# Patient Record
Sex: FEMALE | Race: BLACK OR AFRICAN AMERICAN | Employment: FULL TIME | URBAN - METROPOLITAN AREA
[De-identification: names, ages, dates, MRNs, and addresses within clinical notes are randomized per-mention and may not be internally consistent; named-entity substitution may affect disease eponyms.]

---
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
## 2022-05-04 NOTE — PROGRESS NOTES
Dr. Sonya Martinez I added ARB for bp control and will see her in June.  She could not come sooner.    She did ask for a GI referral bc you discussed her enlarged liver that was seen on January scan.  I saw you placed a referral but she did not see who she should call and I saw no instructions on there.  Could you have Kathya follow-up with her on the GI referral?    Isabella HPI  Pt started with sore throat on left side of throat last week. Started feeling sicker 4 days ago-now has headache, severe body aches, sore throat. Denies fever and cough. Has been taking tylenol and aleve. Feels very tired.      Review of Systems   Con Ear: External ear normal.   Left Ear: External ear normal.   Nose: Mucosal edema and rhinorrhea present. Mouth/Throat: Uvula is midline and mucous membranes are normal. Posterior oropharyngeal erythema present.  No oropharyngeal exudate or posterior oroph

## 2023-10-04 ENCOUNTER — ANESTHESIA (OUTPATIENT)
Dept: SURGERY | Facility: HOSPITAL | Age: 23
End: 2023-10-04
Payer: MEDICAID

## 2023-10-04 ENCOUNTER — HOSPITAL ENCOUNTER (EMERGENCY)
Facility: HOSPITAL | Age: 23
Discharge: HOME | End: 2023-10-04
Attending: OBSTETRICS & GYNECOLOGY

## 2023-10-04 ENCOUNTER — ANESTHESIA EVENT (OUTPATIENT)
Dept: SURGERY | Facility: HOSPITAL | Age: 23
End: 2023-10-04
Payer: MEDICAID

## 2023-10-04 ENCOUNTER — APPOINTMENT (OUTPATIENT)
Dept: ULTRASOUND IMAGING | Facility: HOSPITAL | Age: 23
End: 2023-10-04
Attending: NURSE PRACTITIONER

## 2023-10-04 VITALS
WEIGHT: 117 LBS | HEIGHT: 64 IN | DIASTOLIC BLOOD PRESSURE: 61 MMHG | BODY MASS INDEX: 19.97 KG/M2 | RESPIRATION RATE: 20 BRPM | TEMPERATURE: 99 F | OXYGEN SATURATION: 99 % | SYSTOLIC BLOOD PRESSURE: 114 MMHG | HEART RATE: 93 BPM

## 2023-10-04 DIAGNOSIS — O00.90 RUPTURED ECTOPIC PREGNANCY: Primary | ICD-10-CM

## 2023-10-04 LAB
ANTIBODY SCREEN: NEGATIVE
B-HCG SERPL-ACNC: 244 MIU/ML
B-HCG UR QL: NEGATIVE
B-HCG UR QL: POSITIVE
BASOPHILS # BLD AUTO: 0.06 X10(3) UL (ref 0–0.2)
BASOPHILS NFR BLD AUTO: 0.3 %
BILIRUB UR QL: NEGATIVE
CLARITY UR: CLEAR
COLOR UR: COLORLESS
DEPRECATED RDW RBC AUTO: 42.9 FL (ref 35.1–46.3)
EOSINOPHIL # BLD AUTO: 0.06 X10(3) UL (ref 0–0.7)
EOSINOPHIL NFR BLD AUTO: 0.3 %
ERYTHROCYTE [DISTWIDTH] IN BLOOD BY AUTOMATED COUNT: 12.7 % (ref 11–15)
GLUCOSE UR-MCNC: NORMAL MG/DL
HCT VFR BLD AUTO: 44.3 %
HGB BLD-MCNC: 15 G/DL
IMM GRANULOCYTES # BLD AUTO: 0.09 X10(3) UL (ref 0–1)
IMM GRANULOCYTES NFR BLD: 0.4 %
LEUKOCYTE ESTERASE UR QL STRIP.AUTO: NEGATIVE
LYMPHOCYTES # BLD AUTO: 2.18 X10(3) UL (ref 1–4)
LYMPHOCYTES NFR BLD AUTO: 10.6 %
MCH RBC QN AUTO: 31.1 PG (ref 26–34)
MCHC RBC AUTO-ENTMCNC: 33.9 G/DL (ref 31–37)
MCV RBC AUTO: 91.9 FL
MONOCYTES # BLD AUTO: 0.89 X10(3) UL (ref 0.1–1)
MONOCYTES NFR BLD AUTO: 4.3 %
NEUTROPHILS # BLD AUTO: 17.31 X10 (3) UL (ref 1.5–7.7)
NEUTROPHILS # BLD AUTO: 17.31 X10(3) UL (ref 1.5–7.7)
NEUTROPHILS NFR BLD AUTO: 84.1 %
NITRITE UR QL STRIP.AUTO: NEGATIVE
PH UR: 7 [PH] (ref 5–8)
PLATELET # BLD AUTO: 377 10(3)UL (ref 150–450)
PROT UR-MCNC: NEGATIVE MG/DL
RBC # BLD AUTO: 4.82 X10(6)UL
RH BLOOD TYPE: POSITIVE
RH BLOOD TYPE: POSITIVE
SARS-COV-2 RNA RESP QL NAA+PROBE: NOT DETECTED
SP GR UR STRIP: 1 (ref 1–1.03)
UROBILINOGEN UR STRIP-ACNC: NORMAL
WBC # BLD AUTO: 20.6 X10(3) UL (ref 4–11)

## 2023-10-04 PROCEDURE — 0UT64ZZ RESECTION OF LEFT FALLOPIAN TUBE, PERCUTANEOUS ENDOSCOPIC APPROACH: ICD-10-PCS | Performed by: OBSTETRICS & GYNECOLOGY

## 2023-10-04 PROCEDURE — 59151 TREAT ECTOPIC PREGNANCY: CPT | Performed by: OBSTETRICS & GYNECOLOGY

## 2023-10-04 PROCEDURE — 76801 OB US < 14 WKS SINGLE FETUS: CPT | Performed by: NURSE PRACTITIONER

## 2023-10-04 PROCEDURE — 10T24ZZ RESECTION OF PRODUCTS OF CONCEPTION, ECTOPIC, PERCUTANEOUS ENDOSCOPIC APPROACH: ICD-10-PCS | Performed by: OBSTETRICS & GYNECOLOGY

## 2023-10-04 PROCEDURE — 76817 TRANSVAGINAL US OBSTETRIC: CPT | Performed by: NURSE PRACTITIONER

## 2023-10-04 RX ORDER — HYDROCODONE BITARTRATE AND ACETAMINOPHEN 5; 325 MG/1; MG/1
1-2 TABLET ORAL EVERY 6 HOURS PRN
Qty: 30 TABLET | Refills: 0 | Status: SHIPPED | OUTPATIENT
Start: 2023-10-04

## 2023-10-04 RX ORDER — IBUPROFEN 600 MG/1
600 TABLET ORAL EVERY 8 HOURS PRN
Qty: 30 TABLET | Refills: 0 | Status: SHIPPED | OUTPATIENT
Start: 2023-10-04

## 2023-10-04 RX ORDER — MORPHINE SULFATE 4 MG/ML
2 INJECTION, SOLUTION INTRAMUSCULAR; INTRAVENOUS EVERY 10 MIN PRN
Status: DISCONTINUED | OUTPATIENT
Start: 2023-10-04 | End: 2023-10-04

## 2023-10-04 RX ORDER — KETOROLAC TROMETHAMINE 30 MG/ML
30 INJECTION, SOLUTION INTRAMUSCULAR; INTRAVENOUS EVERY 6 HOURS PRN
Status: DISCONTINUED | OUTPATIENT
Start: 2023-10-04 | End: 2023-10-04

## 2023-10-04 RX ORDER — GLYCOPYRROLATE 0.2 MG/ML
INJECTION, SOLUTION INTRAMUSCULAR; INTRAVENOUS AS NEEDED
Status: DISCONTINUED | OUTPATIENT
Start: 2023-10-04 | End: 2023-10-04 | Stop reason: SURG

## 2023-10-04 RX ORDER — CLINDAMYCIN PHOSPHATE 150 MG/ML
INJECTION, SOLUTION INTRAVENOUS AS NEEDED
Status: DISCONTINUED | OUTPATIENT
Start: 2023-10-04 | End: 2023-10-04 | Stop reason: SURG

## 2023-10-04 RX ORDER — SODIUM CHLORIDE, SODIUM LACTATE, POTASSIUM CHLORIDE, CALCIUM CHLORIDE 600; 310; 30; 20 MG/100ML; MG/100ML; MG/100ML; MG/100ML
INJECTION, SOLUTION INTRAVENOUS CONTINUOUS
Status: DISCONTINUED | OUTPATIENT
Start: 2023-10-04 | End: 2023-10-04

## 2023-10-04 RX ORDER — ONDANSETRON 2 MG/ML
INJECTION INTRAMUSCULAR; INTRAVENOUS AS NEEDED
Status: DISCONTINUED | OUTPATIENT
Start: 2023-10-04 | End: 2023-10-04 | Stop reason: SURG

## 2023-10-04 RX ORDER — SODIUM CHLORIDE, SODIUM LACTATE, POTASSIUM CHLORIDE, CALCIUM CHLORIDE 600; 310; 30; 20 MG/100ML; MG/100ML; MG/100ML; MG/100ML
INJECTION, SOLUTION INTRAVENOUS CONTINUOUS PRN
Status: DISCONTINUED | OUTPATIENT
Start: 2023-10-04 | End: 2023-10-04 | Stop reason: SURG

## 2023-10-04 RX ORDER — MORPHINE SULFATE 10 MG/ML
6 INJECTION, SOLUTION INTRAMUSCULAR; INTRAVENOUS EVERY 10 MIN PRN
Status: DISCONTINUED | OUTPATIENT
Start: 2023-10-04 | End: 2023-10-04

## 2023-10-04 RX ORDER — HYDROMORPHONE HYDROCHLORIDE 1 MG/ML
0.2 INJECTION, SOLUTION INTRAMUSCULAR; INTRAVENOUS; SUBCUTANEOUS EVERY 5 MIN PRN
Status: DISCONTINUED | OUTPATIENT
Start: 2023-10-04 | End: 2023-10-04

## 2023-10-04 RX ORDER — BUPIVACAINE HYDROCHLORIDE 2.5 MG/ML
INJECTION, SOLUTION EPIDURAL; INFILTRATION; INTRACAUDAL AS NEEDED
Status: DISCONTINUED | OUTPATIENT
Start: 2023-10-04 | End: 2023-10-04 | Stop reason: HOSPADM

## 2023-10-04 RX ORDER — LIDOCAINE HYDROCHLORIDE 10 MG/ML
INJECTION, SOLUTION EPIDURAL; INFILTRATION; INTRACAUDAL; PERINEURAL AS NEEDED
Status: DISCONTINUED | OUTPATIENT
Start: 2023-10-04 | End: 2023-10-04 | Stop reason: SURG

## 2023-10-04 RX ORDER — HYDROMORPHONE HYDROCHLORIDE 1 MG/ML
0.6 INJECTION, SOLUTION INTRAMUSCULAR; INTRAVENOUS; SUBCUTANEOUS EVERY 5 MIN PRN
Status: DISCONTINUED | OUTPATIENT
Start: 2023-10-04 | End: 2023-10-04

## 2023-10-04 RX ORDER — ROCURONIUM BROMIDE 10 MG/ML
INJECTION, SOLUTION INTRAVENOUS AS NEEDED
Status: DISCONTINUED | OUTPATIENT
Start: 2023-10-04 | End: 2023-10-04 | Stop reason: SURG

## 2023-10-04 RX ORDER — MORPHINE SULFATE 4 MG/ML
4 INJECTION, SOLUTION INTRAMUSCULAR; INTRAVENOUS EVERY 10 MIN PRN
Status: DISCONTINUED | OUTPATIENT
Start: 2023-10-04 | End: 2023-10-04

## 2023-10-04 RX ORDER — DEXAMETHASONE SODIUM PHOSPHATE 4 MG/ML
VIAL (ML) INJECTION AS NEEDED
Status: DISCONTINUED | OUTPATIENT
Start: 2023-10-04 | End: 2023-10-04 | Stop reason: SURG

## 2023-10-04 RX ORDER — HYDROMORPHONE HYDROCHLORIDE 1 MG/ML
0.4 INJECTION, SOLUTION INTRAMUSCULAR; INTRAVENOUS; SUBCUTANEOUS EVERY 5 MIN PRN
Status: DISCONTINUED | OUTPATIENT
Start: 2023-10-04 | End: 2023-10-04

## 2023-10-04 RX ORDER — NALOXONE HYDROCHLORIDE 0.4 MG/ML
80 INJECTION, SOLUTION INTRAMUSCULAR; INTRAVENOUS; SUBCUTANEOUS AS NEEDED
Status: DISCONTINUED | OUTPATIENT
Start: 2023-10-04 | End: 2023-10-04

## 2023-10-04 RX ORDER — HYDROCODONE BITARTRATE AND ACETAMINOPHEN 5; 325 MG/1; MG/1
1 TABLET ORAL EVERY 6 HOURS PRN
Status: DISCONTINUED | OUTPATIENT
Start: 2023-10-04 | End: 2023-10-04

## 2023-10-04 RX ADMIN — GLYCOPYRROLATE 0.2 MG: 0.2 INJECTION, SOLUTION INTRAMUSCULAR; INTRAVENOUS at 05:00:00

## 2023-10-04 RX ADMIN — ROCURONIUM BROMIDE 30 MG: 10 INJECTION, SOLUTION INTRAVENOUS at 05:00:00

## 2023-10-04 RX ADMIN — LIDOCAINE HYDROCHLORIDE 50 MG: 10 INJECTION, SOLUTION EPIDURAL; INFILTRATION; INTRACAUDAL; PERINEURAL at 05:00:00

## 2023-10-04 RX ADMIN — SODIUM CHLORIDE, SODIUM LACTATE, POTASSIUM CHLORIDE, CALCIUM CHLORIDE: 600; 310; 30; 20 INJECTION, SOLUTION INTRAVENOUS at 04:56:00

## 2023-10-04 RX ADMIN — CLINDAMYCIN PHOSPHATE 900 MG: 150 INJECTION, SOLUTION INTRAVENOUS at 05:09:00

## 2023-10-04 RX ADMIN — ONDANSETRON 4 MG: 2 INJECTION INTRAMUSCULAR; INTRAVENOUS at 05:00:00

## 2023-10-04 RX ADMIN — DEXAMETHASONE SODIUM PHOSPHATE 4 MG: 4 MG/ML VIAL (ML) INJECTION at 05:00:00

## 2023-10-04 NOTE — ED NOTES
To and from [address] without incident    Moved to room 31    Care endorsed to [facility] rn    Pt updated on plan of care of waiting for US results

## 2023-10-04 NOTE — ED QUICK NOTES
Transport arrived to take pt to OR. Pt remains agreeable to plan. All clothing removed from pt, placed in pt belonging bag and pt changed into gown. Pt remains A&Ox4, respirations even and unlabored, skin warm and dry. IVF continued to floor, IV intact.

## 2023-10-04 NOTE — ANESTHESIA POSTPROCEDURE EVALUATION
Patient: Jonatan Pérez    Procedure Summary       Date: 10/04/23 Room / Location: [address] [place] [address] MAIN OR 03 / [facility] MAIN OR    Anesthesia Start: 1145 Anesthesia Stop: 5383    Procedure: LEFT LAPAROSCOPIC SALPINGECTOMY (Left: Abdomen) Diagnosis:       Ruptured ectopic pregnancy      (Ruptured ectopic pregnancy [O00.90])    Surgeons: Lyle Gracia MD Anesthesiologist: Tonita Seip, MD    Anesthesia Type: general ASA Status: 2 - Emergent            Anesthesia Type: general    Vitals Value Taken Time   /92 10/04/23 0608   Temp 97.5 10/04/23 0608   Pulse 86 10/04/23 0608   Resp 16 10/04/23 0608   SpO2 98 10/04/23 0608       [facility] AN Post Evaluation:   Patient Evaluated in PACU  Patient Participation: complete - patient participated  Level of Consciousness: awake  Pain Management: adequate  Airway Patency:patent  Dental exam unchanged from preop  Yes    Cardiovascular Status: acceptable  Respiratory Status: acceptable  Postoperative Hydration acceptable      Nae Bob MD  10/4/2023 6:08 AM

## 2023-10-04 NOTE — ED INITIAL ASSESSMENT (HPI)
Patient arrives with complaints of abdominal cramping that began earlier today, states she started her menstrual cycle yesterday.  Patient reports bleeding is heavier than usual.

## 2023-10-04 NOTE — ED PROVIDER NOTES
Patient endorsed to me by [facility], awaiting ultrasound. Pt is . Bleeding/spotting/lower abdominal pain for 2 days. Found out pregnant tonight. Feels better on my assessment with stable vital signs      US OB    IMPRESSION:  No intrauterine IUP. Endometrium 7 mm. 1.5 x 1 x 0.9 cm peripherally hyperechoic centrally cystic focus in the left adnexa worrisome for tubal ectopic pregnancy. This structure is separate from the left ovary. There is moderate complex fluid in the pelvis suggesting rupture. Recommend OB/oncologic consult. Probable corpus luteum cyst within the left ovary. Right ovarian follicles. Endometrium 7 mm. Case discussed with Dr. Sabrina Jimenez at 04:07 AM GALE Garcia MD/PhD    Patient notified of ultrasound results. Notified of plan for emergency surgery. Advised remain NPO. Discussed with Dr. Nany Kaiser who will see patient in the emergency department and take to OR likely tonight.

## 2023-10-04 NOTE — OPERATIVE REPORT
[facility]    Gyne Operative Note    Steven Collins Patient Status:  Emergency    10/13/2000 MRN [id number]   Location [place] [address] Attending Louise Lira MD   Hosp Day # 0 PCP Nils Pleitez MD     Pre-Operative Diagnosis: Ruptured ectopic pregnancy [O00.90]     Post-Operative Diagnosis: Ruptured ectopic pregnancy [O00.90]      Procedure Performed:   LEFT LAPAROSCOPIC SALPINGECTOMY    Surgeon(s) and Role:     Matias Sykes MD - Primary     * Valentin Lebron MD - Assisting Surgeon     Surgical Findings: Left tubal ectopic pregnancy, about 200cc of intraperitoneal blood, normal ovaries bilaterally, normal right fallopian tube. Otherwise normal abdominal survey     Specimen: Left fallopian tube with ectopic     Estimated Blood Loss: 200cc    Anesthesia: General by Dr. Jaciel Burgess    Antibiotics:  Clindamycin 116AT    Complications: none    Condition: stable      PROCEDURE:  After assuring informed consent, the patient was taken to the operating room, where general anesthesia was administered. She was prepped and draped in the usual sterile manner and placed in the dorsal lithotomy position with legs in Iftikhar stirrups and arms tucked & body supported by Hug A Vac ensuring no nerve pressure. Jenkins catheter was placed. A speculum was inserted. Cervix was grasped and a uterine manipulator was placed without difficulty. Surgeon's gloves were exchanged. Attention was then focused to the abdomen. 0.25% Marcaine was injected into the umbilical apex then a 10 mm skin incision was made with #11 blade. Blunt dissection of adipose tissue done. The fascia was grasped with kocher clamps and incised with a scapel. The fascia was tagged with 0-vicryl and an 11 mm trocar placed directly into abdomen. Laparascopic camera confirmed placement within peritoneal cavity. CO2 gas attached & abdomen insufflated to max 15 mmHG.   Two 5 mm trocars placed under direct visual guidance in left & right lower quadrants. Attention was then focused to the pelvis with the above findings. The left fallopian tube with ectopic pregnancy was ligated used the Enseal device and removed using a laparoscopic bag. Excellent hemostasis noted. A survey of the abdomen demonstrated no injuy to bowel with normal appearance of organs. The pelvis was irrigated and found to be hemostatic as well as bilateral lateral sidewalls. All instruments were then removed from the abdomen. The umbilical fascia was closed with O Vicryl. The knots tied after pneumoperitoneum released & 5 positive pressure breathes given by anesthesia. The umbilical skin incision was closed with 4-0 Monocryl in a subcuticular fashion. Dermabond applied to the 5mm port sites. The patient tolerated the procedure well. Sponge, lap, needle counts were correct x2. She was taken to post anesthesia recovery in stable condition.   Angeles Jara MD  10/4/2023 5:50 AM

## 2023-10-04 NOTE — ANESTHESIA PROCEDURE NOTES
Airway  Date/Time: 10/4/2023 5:16 AM  Urgency: Elective      General Information and Staff    Patient location during procedure: OR  Anesthesiologist: Ananda Stroud MD  Performed: anesthesiologist   Performed by: Ananda Stroud MD  Authorized by: Ananda Stroud MD      Indications and Patient Condition  Indications for airway management: anesthesia  Sedation level: deep  Preoxygenated: yes  Patient position: sniffing  Mask difficulty assessment: 1 - vent by mask    Final Airway Details  Final airway type: endotracheal airway      Successful airway: ETT  Cuffed: yes   Successful intubation technique: direct laryngoscopy  Endotracheal tube insertion site: oral  Blade: Anival  Blade size: #3  ETT size (mm): 7.0    Placement verified by: capnometry   Measured from: teeth  Number of attempts at approach: 1

## 2023-10-04 NOTE — ED QUICK NOTES
Received report, assumed care of pt. Pt returned from [address] in stable condition. Pt A&Ox4, respirations even and unlabored, skin warm and dry.

## 2023-10-04 NOTE — ED QUICK NOTES
Orders for admission, patient is aware of plan and ready to go upstairs. Any questions, please call ED RN Mary White  at extension [phone].      Patient Covid vaccination status: Unvaccinated     COVID Test Ordered in ED: Rapid SARS-CoV-2 by PCR    COVID Suspicion at Admission: N/A    Running Infusions:      Mental Status/LOC at time of transport: A&Ox4    Other pertinent information:   CIWA score: N/A   NIH score:  N/A

## 2023-10-07 ENCOUNTER — HOSPITAL ENCOUNTER (EMERGENCY)
Facility: HOSPITAL | Age: 23
Discharge: HOME | End: 2023-10-07
Attending: EMERGENCY MEDICINE

## 2023-10-07 VITALS
DIASTOLIC BLOOD PRESSURE: 62 MMHG | RESPIRATION RATE: 16 BRPM | HEART RATE: 75 BPM | OXYGEN SATURATION: 98 % | HEIGHT: 62 IN | WEIGHT: 115 LBS | SYSTOLIC BLOOD PRESSURE: 102 MMHG | TEMPERATURE: 98 F | BODY MASS INDEX: 21.16 KG/M2

## 2023-10-07 DIAGNOSIS — L24.A9 WOUND DRAINAGE: Primary | ICD-10-CM

## 2023-10-07 PROCEDURE — 87205 SMEAR GRAM STAIN: CPT | Performed by: EMERGENCY MEDICINE

## 2023-10-07 PROCEDURE — 99283 EMERGENCY DEPT VISIT LOW MDM: CPT

## 2023-10-07 PROCEDURE — 87070 CULTURE OTHR SPECIMN AEROBIC: CPT | Performed by: EMERGENCY MEDICINE

## 2023-10-08 NOTE — ED QUICK NOTES
Pt in stable condition. Pt is A&O x 4 speaking in complete coherent sentences. RR even and unlabored. Pt educated on discharge paperwork including  f/u care. Pt able to verbally teach back and denies any questions or concerns. Pt ambulated out of ED with even steady gait.
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
## 2023-11-15 ENCOUNTER — HOSPITAL ENCOUNTER (OUTPATIENT)
Dept: ULTRASOUND IMAGING | Facility: HOSPITAL | Age: 23
Discharge: HOME | End: 2023-11-15
Attending: ADVANCED PRACTICE MIDWIFE
Payer: MEDICAID

## 2023-11-15 ENCOUNTER — TELEPHONE (OUTPATIENT)
Dept: OBGYN CLINIC | Facility: CLINIC | Age: 23
End: 2023-11-15

## 2023-11-15 ENCOUNTER — OFFICE VISIT (OUTPATIENT)
Dept: OBGYN CLINIC | Facility: CLINIC | Age: 23
End: 2023-11-15
Payer: MEDICAID

## 2023-11-15 ENCOUNTER — LAB ENCOUNTER (OUTPATIENT)
Dept: LAB | Facility: HOSPITAL | Age: 23
End: 2023-11-15
Attending: ADVANCED PRACTICE MIDWIFE
Payer: MEDICAID

## 2023-11-15 VITALS
HEIGHT: 64 IN | DIASTOLIC BLOOD PRESSURE: 57 MMHG | BODY MASS INDEX: 17.75 KG/M2 | SYSTOLIC BLOOD PRESSURE: 90 MMHG | WEIGHT: 104 LBS | HEART RATE: 89 BPM

## 2023-11-15 DIAGNOSIS — Z87.59 HISTORY OF ECTOPIC PREGNANCY: Primary | ICD-10-CM

## 2023-11-15 DIAGNOSIS — Z87.59 HISTORY OF ECTOPIC PREGNANCY: ICD-10-CM

## 2023-11-15 DIAGNOSIS — Z78.9 DATE OF LAST MENSTRUAL PERIOD (LMP) UNKNOWN: ICD-10-CM

## 2023-11-15 PROBLEM — R51.9 CHRONIC NONINTRACTABLE HEADACHE: Status: ACTIVE | Noted: 2023-04-06

## 2023-11-15 PROBLEM — G89.29 CHRONIC NONINTRACTABLE HEADACHE: Status: ACTIVE | Noted: 2023-04-06

## 2023-11-15 PROBLEM — D50.9 IRON DEFICIENCY ANEMIA: Status: ACTIVE | Noted: 2023-04-06

## 2023-11-15 LAB — B-HCG SERPL-ACNC: 5018.7 MIU/ML

## 2023-11-15 PROCEDURE — 84702 CHORIONIC GONADOTROPIN TEST: CPT

## 2023-11-15 PROCEDURE — 99213 OFFICE O/P EST LOW 20 MIN: CPT | Performed by: ADVANCED PRACTICE MIDWIFE

## 2023-11-15 PROCEDURE — 3074F SYST BP LT 130 MM HG: CPT | Performed by: ADVANCED PRACTICE MIDWIFE

## 2023-11-15 PROCEDURE — 3008F BODY MASS INDEX DOCD: CPT | Performed by: ADVANCED PRACTICE MIDWIFE

## 2023-11-15 PROCEDURE — 76801 OB US < 14 WKS SINGLE FETUS: CPT | Performed by: ADVANCED PRACTICE MIDWIFE

## 2023-11-15 PROCEDURE — 76817 TRANSVAGINAL US OBSTETRIC: CPT | Performed by: ADVANCED PRACTICE MIDWIFE

## 2023-11-15 PROCEDURE — 3078F DIAST BP <80 MM HG: CPT | Performed by: ADVANCED PRACTICE MIDWIFE

## 2023-11-15 PROCEDURE — 36415 COLL VENOUS BLD VENIPUNCTURE: CPT

## 2023-11-15 NOTE — PROGRESS NOTES
Chief Complaint   Patient presents with    Consult     Missed menses, LMP 10/01/23, LIZY  24,pt does not want to continue pregnancy ,   Pt did have HCG levels drawn ,         HPI:   Oly Dorman is 24year old , here today with concern for new pregnancy after surgery for ruptured ectopic last month. Was cared for by [facility] for that procedure. Went to [facility] this week for follow-up and found out she was pregnant again. They said they cannot care for her this pregnancy due to insurance issue so she came here. She denies pain or bleeding at this time but she is scared that she could have a repeat ectopic. Patient Active Problem List   Diagnosis    Ruptured ectopic pregnancy    Chronic nonintractable headache    Iron deficiency anemia        Note: This is a gyn only visit. Pt has PCP and is referred back to PCP for care of any non-gyn concerns listed above in the Problem List.    Medications (Active prior to today's visit):  Current Outpatient Medications   Medication Sig Dispense Refill    HYDROcodone-acetaminophen 5-325 MG Oral Tab Take 1-2 tablets by mouth every 6 (six) hours as needed for Pain. (Patient not taking: Reported on 11/15/2023) 30 tablet 0    ibuprofen 600 MG Oral Tab Take 1 tablet (600 mg total) by mouth every 8 (eight) hours as needed for Pain. (Patient not taking: Reported on 11/15/2023) 30 tablet 0    ondansetron 4 MG Oral Tablet Dispersible Take 1 tablet (4 mg total) by mouth every 8 (eight) hours as needed for Nausea. (Patient not taking: Reported on 11/15/2023) 20 tablet 0    ibuprofen 600 MG Oral Tab Take 1 tablet (600 mg total) by mouth every 6 hours as needed with food (Patient not taking: Reported on 11/15/2023) 20 tablet 0       Allergies: Allergies   Allergen Reactions    Amoxicillin OTHER (SEE COMMENTS)       ROS:  Review of Systems   Constitutional:  Negative for chills and fever. Gastrointestinal:  Negative for abdominal pain.    Genitourinary:  Negative for pelvic pain and vaginal bleeding. Musculoskeletal:  Negative for back pain. PHYSICAL EXAM:  Vitals:    11/15/23 0934   BP: 90/57   Pulse: 89     Physical Exam  Vitals reviewed. Constitutional:       Appearance: Normal appearance. HENT:      Head: Normocephalic. Pulmonary:      Effort: Pulmonary effort is normal.   Abdominal:      Tenderness: There is no abdominal tenderness. There is no guarding or rebound. Neurological:      Mental Status: She is alert and oriented to person, place, and time. Psychiatric:         Behavior: Behavior normal.         Thought Content: Thought content normal.         Judgment: Judgment normal.        11/13 bHCG at [facility] was 2K+  O positive blood type     ASSESSMENT/PLAN:     Cloin Diaz was seen today for consult. Diagnoses and all orders for this visit:    History of ectopic pregnancy  -     HCG, Beta Subunit (Quant Pregnancy Test); Future  -     US PREG 1ST TRIMESTER (CPT=76801); Future       Will await bHCG result prior to scheduling ultrasound    Follow-up/Return to clinic: pending today's results    Patient verbalized understanding, All questions answered.  No barriers to learning identified

## 2023-11-15 NOTE — TELEPHONE ENCOUNTER
Outgoing call to ultrasound. Patient scheduled for a hold and call at 4 pm today. Main diagnostic. Name and  of patient verified    Patient notified of hold and call ultrasound scheduled at 4 pm. Parking and water instructions given. Order placed.

## 2023-11-16 NOTE — TELEPHONE ENCOUNTER
Spoke with patient with ultrasound results. Intrauterine gestational sac and yolk sac. No fetal pole at this time. Likely too early to date. Recommend repeat ultrasound in 11-14 days. Order placed. Reviewed warning signs and when to call.

## 2023-11-27 ENCOUNTER — HOSPITAL ENCOUNTER (OUTPATIENT)
Dept: ULTRASOUND IMAGING | Age: 23
Discharge: HOME | End: 2023-11-27
Attending: ADVANCED PRACTICE MIDWIFE
Payer: MEDICAID

## 2023-11-27 DIAGNOSIS — Z87.59 HISTORY OF ECTOPIC PREGNANCY: ICD-10-CM

## 2023-11-27 PROCEDURE — 76817 TRANSVAGINAL US OBSTETRIC: CPT | Performed by: ADVANCED PRACTICE MIDWIFE

## 2023-11-27 PROCEDURE — 76801 OB US < 14 WKS SINGLE FETUS: CPT | Performed by: ADVANCED PRACTICE MIDWIFE

## 2024-01-10 NOTE — ED INITIAL ASSESSMENT (HPI)
Lower abdominal cramping. Last period 11/7/18. States that she has been having unprotected sex. Detail Level: Zone Length Of Therapy: 3+ years Date Of Last Negative Ppd (Optional): 1/23 Add High Risk Medication Management Associated Diagnosis?: No

## 2024-12-17 ENCOUNTER — APPOINTMENT (OUTPATIENT)
Dept: GENERAL RADIOLOGY | Age: 24
End: 2024-12-17
Attending: EMERGENCY MEDICINE
Payer: MEDICAID

## 2024-12-17 ENCOUNTER — HOSPITAL ENCOUNTER (OUTPATIENT)
Age: 24
Discharge: HOME | End: 2024-12-17
Payer: MEDICAID

## 2024-12-17 VITALS
DIASTOLIC BLOOD PRESSURE: 67 MMHG | OXYGEN SATURATION: 100 % | TEMPERATURE: 100 F | HEART RATE: 103 BPM | RESPIRATION RATE: 20 BRPM | SYSTOLIC BLOOD PRESSURE: 118 MMHG

## 2024-12-17 DIAGNOSIS — J10.1 INFLUENZA A: Primary | ICD-10-CM

## 2024-12-17 DIAGNOSIS — R09.89 CHEST CONGESTION: ICD-10-CM

## 2024-12-17 DIAGNOSIS — R07.9 ACUTE CHEST PAIN: ICD-10-CM

## 2024-12-17 DIAGNOSIS — R05.9 COUGH: ICD-10-CM

## 2024-12-17 DIAGNOSIS — J98.01 ACUTE BRONCHOSPASM: ICD-10-CM

## 2024-12-17 LAB
ATRIAL RATE: 98 BPM
B-HCG UR QL: NEGATIVE
BASOPHILS # BLD AUTO: 0.02 X10(3) UL (ref 0–0.2)
BASOPHILS NFR BLD AUTO: 0.4 %
BUN BLD-MCNC: 5 MG/DL (ref 7–18)
CHLORIDE BLD-SCNC: 102 MMOL/L (ref 98–112)
CLARITY UR: CLEAR
CO2 BLD-SCNC: 22 MMOL/L (ref 21–32)
CREAT BLD-MCNC: 0.9 MG/DL
DEPRECATED RDW RBC AUTO: 41 FL (ref 35.1–46.3)
EGFRCR SERPLBLD CKD-EPI 2021: 91 ML/MIN/1.73M2 (ref 60–?)
EOSINOPHIL # BLD AUTO: 0.01 X10(3) UL (ref 0–0.7)
EOSINOPHIL NFR BLD AUTO: 0.2 %
ERYTHROCYTE [DISTWIDTH] IN BLOOD BY AUTOMATED COUNT: 12.3 % (ref 11–15)
GLUCOSE BLD-MCNC: 101 MG/DL (ref 70–99)
GLUCOSE UR STRIP-MCNC: NEGATIVE MG/DL
HCT VFR BLD AUTO: 38.8 %
HCT VFR BLD AUTO: 40.1 %
HCT VFR BLD CALC: 43 %
HGB BLD-MCNC: 13.2 G/DL
HGB BLD-MCNC: 13.8 G/DL
IMM GRANULOCYTES # BLD AUTO: 0.02 X10(3) UL (ref 0–1)
IMM GRANULOCYTES NFR BLD: 0.4 %
ISTAT IONIZED CALCIUM FOR CHEM 8: 1.14 MMOL/L (ref 1.12–1.32)
KETONES UR STRIP-MCNC: 80 MG/DL
LEUKOCYTE ESTERASE UR QL STRIP: NEGATIVE
LYMPHOCYTES # BLD AUTO: 0.6 X10(3) UL (ref 1–4)
LYMPHOCYTES NFR BLD AUTO: 12.4 %
MCH RBC QN AUTO: 30.3 PG (ref 26–34)
MCH RBC QN AUTO: 31.9 PG (ref 26–34)
MCHC RBC AUTO-ENTMCNC: 32.9 G/DL (ref 31–37)
MCHC RBC AUTO-ENTMCNC: 35.6 G/DL (ref 31–37)
MCV RBC AUTO: 89.6 FL
MCV RBC AUTO: 92 FL (ref 80–100)
MONOCYTES # BLD AUTO: 0.46 X10(3) UL (ref 0.1–1)
MONOCYTES NFR BLD AUTO: 9.5 %
NEUTROPHILS # BLD AUTO: 3.72 X10 (3) UL (ref 1.5–7.7)
NEUTROPHILS # BLD AUTO: 3.72 X10(3) UL (ref 1.5–7.7)
NEUTROPHILS NFR BLD AUTO: 77.1 %
NITRITE UR QL STRIP: NEGATIVE
P AXIS: 72 DEGREES
P-R INTERVAL: 132 MS
PH UR STRIP: 7.5 [PH]
PLATELET # BLD AUTO: 205 X10ˆ3/UL (ref 150–450)
PLATELET # BLD AUTO: 212 10(3)UL (ref 150–450)
POCT INFLUENZA A: POSITIVE
POCT INFLUENZA B: NEGATIVE
POTASSIUM BLD-SCNC: 3.4 MMOL/L (ref 3.6–5.1)
PROT UR STRIP-MCNC: 100 MG/DL
Q-T INTERVAL: 324 MS
QRS DURATION: 90 MS
QTC CALCULATION (BEZET): 413 MS
R AXIS: 67 DEGREES
RBC # BLD AUTO: 4.33 X10(6)UL
RBC # BLD AUTO: 4.36 X10ˆ6/UL
S PYO AG THROAT QL: NEGATIVE
SODIUM BLD-SCNC: 138 MMOL/L (ref 136–145)
SP GR UR STRIP: 1.02
T AXIS: 49 DEGREES
TROPONIN I BLD-MCNC: <0.02 NG/ML
UROBILINOGEN UR STRIP-ACNC: 2 MG/DL
VENTRICULAR RATE: 98 BPM
WBC # BLD AUTO: 4.8 X10(3) UL (ref 4–11)
WBC # BLD AUTO: 4.9 X10ˆ3/UL (ref 4–11)

## 2024-12-17 PROCEDURE — 87880 STREP A ASSAY W/OPTIC: CPT | Performed by: EMERGENCY MEDICINE

## 2024-12-17 PROCEDURE — A9150 MISC/EXPER NON-PRESCRIPT DRU: HCPCS | Performed by: EMERGENCY MEDICINE

## 2024-12-17 PROCEDURE — 84484 ASSAY OF TROPONIN QUANT: CPT | Performed by: EMERGENCY MEDICINE

## 2024-12-17 PROCEDURE — 85025 COMPLETE CBC W/AUTO DIFF WBC: CPT | Performed by: EMERGENCY MEDICINE

## 2024-12-17 PROCEDURE — 87502 INFLUENZA DNA AMP PROBE: CPT | Performed by: EMERGENCY MEDICINE

## 2024-12-17 PROCEDURE — S0119 ONDANSETRON 4 MG: HCPCS | Performed by: EMERGENCY MEDICINE

## 2024-12-17 PROCEDURE — 99214 OFFICE O/P EST MOD 30 MIN: CPT | Performed by: EMERGENCY MEDICINE

## 2024-12-17 PROCEDURE — 71046 X-RAY EXAM CHEST 2 VIEWS: CPT | Performed by: EMERGENCY MEDICINE

## 2024-12-17 PROCEDURE — 81002 URINALYSIS NONAUTO W/O SCOPE: CPT | Performed by: EMERGENCY MEDICINE

## 2024-12-17 PROCEDURE — 94640 AIRWAY INHALATION TREATMENT: CPT | Performed by: EMERGENCY MEDICINE

## 2024-12-17 PROCEDURE — 80047 BASIC METABLC PNL IONIZED CA: CPT | Performed by: EMERGENCY MEDICINE

## 2024-12-17 PROCEDURE — 81025 URINE PREGNANCY TEST: CPT | Performed by: EMERGENCY MEDICINE

## 2024-12-17 PROCEDURE — 93000 ELECTROCARDIOGRAM COMPLETE: CPT | Performed by: EMERGENCY MEDICINE

## 2024-12-17 RX ORDER — ALBUTEROL SULFATE 90 UG/1
INHALANT RESPIRATORY (INHALATION)
Qty: 1 EACH | Refills: 0 | Status: SHIPPED | OUTPATIENT
Start: 2024-12-17

## 2024-12-17 RX ORDER — IPRATROPIUM BROMIDE AND ALBUTEROL SULFATE 2.5; .5 MG/3ML; MG/3ML
3 SOLUTION RESPIRATORY (INHALATION) ONCE
Status: COMPLETED | OUTPATIENT
Start: 2024-12-17 | End: 2024-12-17

## 2024-12-17 RX ORDER — ONDANSETRON 4 MG/1
TABLET, ORALLY DISINTEGRATING ORAL
Qty: 10 TABLET | Refills: 0 | Status: SHIPPED | OUTPATIENT
Start: 2024-12-17

## 2024-12-17 RX ORDER — BENZONATATE 100 MG/1
100 CAPSULE ORAL 3 TIMES DAILY PRN
Qty: 30 CAPSULE | Refills: 0 | Status: SHIPPED | OUTPATIENT
Start: 2024-12-17

## 2024-12-17 RX ORDER — ONDANSETRON 4 MG/1
8 TABLET, ORALLY DISINTEGRATING ORAL ONCE
Status: COMPLETED | OUTPATIENT
Start: 2024-12-17 | End: 2024-12-17

## 2024-12-17 RX ORDER — ACETAMINOPHEN 500 MG
1000 TABLET ORAL ONCE
Status: COMPLETED | OUTPATIENT
Start: 2024-12-17 | End: 2024-12-17

## 2024-12-17 RX ORDER — OSELTAMIVIR PHOSPHATE 75 MG/1
75 CAPSULE ORAL 2 TIMES DAILY
Qty: 10 CAPSULE | Refills: 0 | Status: SHIPPED | OUTPATIENT
Start: 2024-12-17 | End: 2024-12-22

## 2024-12-17 NOTE — ED PROVIDER NOTES
Patient Seen in: [facility]      History     Chief Complaint   Patient presents with    Cough/URI     Stated Complaint: SOB. cold symptoms    Subjective:   HPI  Steffi Garsia is a 24 year old female here for upper chest pain/chest congestion, cough, fever, and flulike symptoms.  Does not feel short of breath, but is coughing more often.        Objective:     History reviewed. No pertinent past medical history.           History reviewed. No pertinent surgical history.             Social History     Socioeconomic History    Marital status: Single   Tobacco Use    Smoking status: Some Days    Smokeless tobacco: Former   Vaping Use    Vaping status: Never Used   Substance and Sexual Activity    Alcohol use: No    Drug use: Yes     Types: Cannabis     Social Drivers of Health     Financial Resource Strain: Low Risk  (4/29/2024)    Received from [facility]    Overall Financial Resource Strain (CARDIA)     Difficulty of Paying Living Expenses: Not very hard   Food Insecurity: No Food Insecurity (4/29/2024)    Received from [facility]    Hunger Vital Sign     Worried About Running Out of Food in the Last Year: Never true     Ran Out of Food in the Last Year: Never true   Transportation Needs: No Transportation Needs (5/16/2024)    Received from [facility]    PRAPARE - Transportation     Lack of Transportation (Medical): No     Lack of Transportation (Non-Medical): No   Recent Concern: Transportation Needs - Unmet Transportation Needs (4/29/2024)    [facility]     Lack of Transportation (Medical): No     Lack of Transportation (Non-Medical): Yes   Physical Activity: Sufficiently Active (5/16/2024)    Received from [facility]    Exercise Vital Sign     Days of Exercise per Week: 3 days     Minutes of Exercise per Session: 60 min   Stress: No Stress Concern  Present (5/16/2024)    Received from [facility]    [employer] [place] [employer] - Occupational Stress Questionnaire     Feeling of Stress : Not at all   Social Connections: Socially Integrated (5/16/2024)    Received from [facility]    Social Connection and Isolation Panel [NHANES]     Frequency of Communication with Friends and Family: Twice a week     Frequency of Social Gatherings with Friends and Family: Once a week     Attends Buddhist Services: 1 to 4 times per year     Active Member of Clubs or Organizations: Yes     Attends Club or Organization Meetings: 1 to 4 times per year     Marital Status: Living with partner   Housing Stability: Low Risk  (4/29/2024)    Received from [facility]    Housing Stability Vital Sign     Unable to Pay for Housing in the Last Year: No     Number of Places Lived in the Last Year: 1     Unstable Housing in the Last Year: No              Review of Systems    Positive for stated complaint: SOB. cold symptoms  Other systems are as noted in HPI.  Constitutional and vital signs reviewed.      All other systems reviewed and negative except as noted above.    Physical Exam     ED Triage Vitals [12/17/24 1046]   /67   Pulse 103   Resp 20   Temp 100.3 °F (37.9 °C)   Temp src Oral   SpO2 100 %   O2 Device None (Room air)       Current Vitals:   Vital Signs  BP: 118/67  Pulse: 103  Resp: 20  Temp: 100.3 °F (37.9 °C)  Temp src: Oral    Oxygen Therapy  SpO2: 100 %  O2 Device: None (Room air)        Physical Exam  Vitals and nursing note reviewed.   Constitutional:       General: She is not in acute distress.     Appearance: Normal appearance. She is ill-appearing. She is not toxic-appearing.   HENT:      Head: Normocephalic.      Right Ear: Ear canal and external ear normal.      Left Ear: Ear canal and external ear normal.      Nose: Congestion and rhinorrhea (Clear drainage) present.      Mouth/Throat:       Mouth: Mucous membranes are moist.      Pharynx: Posterior oropharyngeal erythema present. No oropharyngeal exudate.   Eyes:      Conjunctiva/sclera: Conjunctivae normal.      Pupils: Pupils are equal, round, and reactive to light.   Cardiovascular:      Rate and Rhythm: Normal rate.      Pulses: Normal pulses.      Comments: Heart rate 98 beats on exam.  Strong and regular.  Normal for age, presenting  Pulmonary:      Effort: Pulmonary effort is normal. No respiratory distress.      Breath sounds: No stridor. Rhonchi (Anterior cleared with cough) present. No wheezing.   Abdominal:      General: There is no distension.      Tenderness: There is no abdominal tenderness. There is no right CVA tenderness, left CVA tenderness or guarding.   Musculoskeletal:      Cervical back: Normal range of motion. No erythema or rigidity. No pain with movement, spinous process tenderness or muscular tenderness. Normal range of motion.   Lymphadenopathy:      Cervical: No cervical adenopathy.      Right cervical: No superficial, deep or posterior cervical adenopathy.     Left cervical: No superficial, deep or posterior cervical adenopathy.   Skin:     General: Skin is warm.      Capillary Refill: Capillary refill takes less than 2 seconds.      Findings: No erythema, lesion or rash.   Neurological:      General: No focal deficit present.      Mental Status: She is alert and oriented to person, place, and time.   Psychiatric:         Attention and Perception: Attention normal.         Mood and Affect: Mood is anxious.         Speech: Speech normal.         Behavior: Behavior normal.         Thought Content: Thought content normal.         Judgment: Judgment normal.             ED Course     Labs Reviewed   [facility] POCT URINALYSIS DIPSTICK - Abnormal; Notable for the following components:       Result Value    Protein urine 100 (*)     Ketone, Urine 80 (*)     Bilirubin, Urine Small (*)     Blood, Urine Large (*)     Urobilinogen urine 2.0  (*)     All other components within normal limits   POCT ISTAT CHEM8 CARTRIDGE - Abnormal; Notable for the following components:    ISTAT BUN 5 (*)     ISTAT Potassium 3.4 (*)     ISTAT Glucose 101 (*)     All other components within normal limits   POCT FLU TEST - Abnormal; Notable for the following components:    POCT INFLUENZA A Positive (*)     All other components within normal limits    Narrative:     This assay is a rapid molecular in vitro test utilizing nucleic acid amplification of influenza A and B viral RNA.   POCT RAPID STREP - Normal   POCT PREGNANCY URINE - Normal   ISTAT TROPONIN - Normal   CBC W AUTO DIFF   POCT CBC     EKG    Rate, intervals and axes as noted on EKG Report.  Rate: 98  Rhythm: sinus  Reading: abn                       MDM           Medical Decision Making  Ddx include but not limited to influenza, COVID, pericarditis, ACS, pneumonia, or somatic causes symptoms.  Low risk for pulmonary embolism.    Treat for influenza A.  Prescription sent to the pharmacy to take as directed.  Chest x-ray negative for pneumonia.  EKG negative for STEMI, negative troponin.  Heart score low.  Symptoms slightly better after duo nebulizer treatment.  Continues to have mild chest pain/tightness.  No recent travel, hemoptysis, oral birth control, recent travel, recent procedures, or cancer.  Strict ER precautions discussed, primary care follow-up reviewed.  No acute distress and cleared for discharge home stable condition    Problems Addressed:  Acute bronchospasm: acute illness or injury  Acute chest pain: acute illness or injury  Chest congestion: acute illness or injury  Cough: acute illness or injury  Influenza A: acute illness or injury    Amount and/or Complexity of Data Reviewed  External Data Reviewed: notes.  Labs: ordered. Decision-making details documented in ED Course.     Details: Independent interpretation. Reviewed with patient  ECG/medicine tests: ordered and independent interpretation  performed. Decision-making details documented in ED Course.     Details: No active STEMI.  Independent interpretation    Risk  OTC drugs.  Prescription drug management.        Disposition and Plan     Clinical Impression:  1. Influenza A    2. Cough    3. Chest congestion    4. Acute bronchospasm    5. Acute chest pain         Disposition:  Discharge  12/17/2024  1:12 pm    Follow-up:  Anjali Colby MD  [address] [place] [id number]  [phone]                Medications Prescribed:  Discharge Medication List as of 12/17/2024  1:20 PM        START taking these medications    Details   oseltamivir (TAMIFLU) 75 MG Oral Cap Take 1 capsule (75 mg total) by mouth 2 (two) times daily for 5 days., Normal, Disp-10 capsule, R-0      albuterol 108 (90 Base) MCG/ACT Inhalation Aero Soln Inhale 1 puff and hold breath for 10 seconds then exhale.  Wait 1 full minute and repeat for second puff.  Use every 4-6 hours as needed., Normal, Disp-1 each, R-0NPI [id number]. Collaborating MD Vangie Roper.      benzonatate 100 MG Oral Cap Take 1 capsule (100 mg total) by mouth 3 (three) times daily as needed for cough., Normal, Disp-30 capsule, R-0NPI [id number].  Collaborating physician Vangie Roper.                 Supplementary Documentation:

## 2024-12-17 NOTE — ED INITIAL ASSESSMENT (HPI)
Pt states cough congestion sore throat ear pain and chest pain that began on Friday. Pt states has also had NVD.

## 2024-12-17 NOTE — DISCHARGE INSTRUCTIONS
Zofran sent to the pharmacy.  This is an antinausea medication.  He can take this 20 minutes before taking over-the-counter medication such as Mucinex, TheraFlu, ibuprofen, or Tylenol.  Tamiflu sent to the pharmacy to treat for flu this is different than Zofran, and other over-the-counter medications, but it may cause an upset stomach.  There is Tylenol and over-the-counter Mucinex, and TheraFlu so do not double dose yourself.  Go to the ER for any new or worsening symptoms, and call your primary care provider tomorrow.  Albuterol inhaler inhale 1 puff and hold your breath for 10 seconds.  After 10 seconds exhale.  Wait 60 seconds and do the same for the second puff.  Do this every 4-6 hours as needed for coughing spasms, and chest tightness.

## 2025-03-03 ENCOUNTER — TELEPHONE (OUTPATIENT)
Dept: OBGYN CLINIC | Facility: CLINIC | Age: 25
End: 2025-03-03

## 2025-03-03 NOTE — TELEPHONE ENCOUNTER
Outgoing call to patient to notify that current insurance on file is ineligible and to notify that appointment has been marked as self pay due to no current insurance on file. This PSR LDMTCB to update insurance.

## 2025-03-14 ENCOUNTER — APPOINTMENT (OUTPATIENT)
Dept: ULTRASOUND IMAGING | Facility: HOSPITAL | Age: 25
End: 2025-03-14
Attending: EMERGENCY MEDICINE
Payer: MEDICAID

## 2025-03-14 ENCOUNTER — HOSPITAL ENCOUNTER (EMERGENCY)
Facility: HOSPITAL | Age: 25
Discharge: HOME | End: 2025-03-14
Attending: EMERGENCY MEDICINE
Payer: MEDICAID

## 2025-03-14 VITALS
HEART RATE: 65 BPM | RESPIRATION RATE: 19 BRPM | OXYGEN SATURATION: 97 % | DIASTOLIC BLOOD PRESSURE: 61 MMHG | SYSTOLIC BLOOD PRESSURE: 104 MMHG | TEMPERATURE: 98 F

## 2025-03-14 DIAGNOSIS — O26.899 ABDOMINAL PAIN AFFECTING PREGNANCY (HCC): Primary | ICD-10-CM

## 2025-03-14 DIAGNOSIS — R10.9 ABDOMINAL PAIN AFFECTING PREGNANCY (HCC): Primary | ICD-10-CM

## 2025-03-14 LAB
B-HCG SERPL-ACNC: ABNORMAL MIU/ML
B-HCG UR QL: POSITIVE

## 2025-03-14 PROCEDURE — 76801 OB US < 14 WKS SINGLE FETUS: CPT | Performed by: EMERGENCY MEDICINE

## 2025-03-14 PROCEDURE — 84702 CHORIONIC GONADOTROPIN TEST: CPT | Performed by: EMERGENCY MEDICINE

## 2025-03-14 PROCEDURE — 36415 COLL VENOUS BLD VENIPUNCTURE: CPT

## 2025-03-14 PROCEDURE — 76817 TRANSVAGINAL US OBSTETRIC: CPT | Performed by: EMERGENCY MEDICINE

## 2025-03-14 PROCEDURE — 99285 EMERGENCY DEPT VISIT HI MDM: CPT

## 2025-03-14 PROCEDURE — 99284 EMERGENCY DEPT VISIT MOD MDM: CPT

## 2025-03-14 PROCEDURE — 81025 URINE PREGNANCY TEST: CPT

## 2025-03-14 RX ORDER — ACETAMINOPHEN 325 MG/1
650 TABLET ORAL ONCE
Status: COMPLETED | OUTPATIENT
Start: 2025-03-14 | End: 2025-03-14

## 2025-03-14 NOTE — ED QUICK NOTES
Patient safe to be discharged home per provider. Discharge education given via printed AVS. Reviewed: follow up care with OBGYN, PRN medications and when to seek emergency care. Patient verbalizes understanding and is able to teach back. Patient ambulated to exit.

## 2025-03-14 NOTE — ED INITIAL ASSESSMENT (HPI)
Pt c/o of lower abdominal pain that started yesterday night. Pt had a + pregnancy test 3 weeks ago.   LMP was 02/03. Hx of ectopic pregnancy

## 2025-03-15 NOTE — ED PROVIDER NOTES
Patient Seen in: [facility] Emergency Department    History     Chief Complaint   Patient presents with    Pregnancy Issues    Abdomen/Flank Pain       HPI    Patient presents to the ED complaining of lower abdominal pain starting last night.  She states that she had a positive pregnancy test at home 3 weeks ago and is concerned that she has an ectopic pregnancy which she has had before.  LMP, 2/3.  Denies other complaints.  No vaginal bleeding or urinary symptoms.  O+ blood type on chart review.    History reviewed. No past medical history on file.    History reviewed. No past surgical history on file.      Medications :  Prescriptions Prior to Admission[1]     No family history on file.    Smoking Status:   Social History     Socioeconomic History    Marital status: Single   Tobacco Use    Smoking status: Some Days    Smokeless tobacco: Former   Vaping Use    Vaping status: Never Used   Substance and Sexual Activity    Alcohol use: No    Drug use: Yes     Types: Cannabis       Constitutional and vital signs reviewed.      Social History and Family History elements reviewed from today, pertinent positives to the presenting problem noted.    Physical Exam     ED Triage Vitals [03/14/25 1316]   /63   Pulse 84   Resp 18   Temp 97.8 °F (36.6 °C)   Temp src Temporal   SpO2 98 %   O2 Device None (Room air)       All measures to prevent infection transmission during my interaction with the patient were taken. Handwashing was performed prior to and after the exam.  Stethoscope and any equipment used during my examination was cleaned with super sani-cloth germicidal wipes following the exam.     Physical Exam  Vitals and nursing note reviewed.   Constitutional:       General: She is not in acute distress.     Appearance: She is well-developed.   HENT:      Head: Normocephalic and atraumatic.   Eyes:      General:         Right eye: No discharge.         Left eye: No discharge.      Conjunctiva/sclera:  Conjunctivae normal.   Neck:      Trachea: No tracheal deviation.   Cardiovascular:      Rate and Rhythm: Normal rate.   Pulmonary:      Effort: Pulmonary effort is normal. No respiratory distress.      Breath sounds: No stridor.   Abdominal:      General: There is no distension.      Palpations: Abdomen is soft.      Tenderness: There is abdominal tenderness in the suprapubic area. There is no guarding or rebound.   Musculoskeletal:         General: No deformity.   Skin:     General: Skin is warm and dry.   Neurological:      Mental Status: She is alert and oriented to person, place, and time.   Psychiatric:         Mood and Affect: Mood normal.         Behavior: Behavior normal.         ED Course        Labs Reviewed   HCG, BETA SUBUNIT (QUANT PREGNANCY TEST) - Abnormal; Notable for the following components:       Result Value    Hcg Quantitative 25,976.6 (*)     All other components within normal limits   POCT PREGNANCY URINE - Abnormal; Notable for the following components:    POCT Urine Pregnancy Positive (*)     All other components within normal limits       As Interpreted by me    Imaging Results Available and Reviewed while in ED: US PREG 1ST TRIM W/EV (CPT=76801/97174)    Result Date: 3/14/2025  CONCLUSION: Single viable intrauterine gestation with ultrasound age matching provided menstrual age of 6 weeks 2 days.  Small fluid collection adjacent to the gestational sac suspicious for subchorionic bleed, and because of this finding recommend short-term follow-up.  Left ovarian corpus luteum.  Indeterminate 1 cm complex cyst within the right ovary.  Dictated by (CST): Connor Bush MD on 3/14/2025 at 5:18 PM     Finalized by (CST): Connor Bush MD on 3/14/2025 at 5:23 PM         ED Medications Administered:   Medications   acetaminophen (Tylenol) tab 650 mg (650 mg Oral Given 3/14/25 1546)         MDM     Vitals:    03/14/25 1316 03/14/25 1747   BP: 106/63 104/61   Pulse: 84 65   Resp: 18 19   Temp: 97.8 °F  (36.6 °C)    TempSrc: Temporal    SpO2: 98% 97%     *I personally reviewed and interpreted all ED vitals.    Pulse Ox: 97%, Room air, Normal     Monitor Interpretation:   normal sinus rhythm as interpreted by me.  The cardiac monitor was ordered to monitor heart rate.    Differential Diagnosis/ Diagnostic Considerations: Ectopic pregnancy, abdominal pain in early pregnancy, other    Complicating Factors: The patient already has does not have any pertinent problems on file. to contribute to the complexity of this ED evaluation.    Medical Decision Making  Patient presents to the ED with superpubic abdominal pain in early pregnancy.  Benign abdomen on exam.  No significant tenderness.  Laboratory testing with an elevated beta hCG level.  Ultrasound obtained which shows a live 6-week 2-day old IUP.  Patient advised on prompt outpatient OB/GYN follow-up and stable for discharge at this time.    Problems Addressed:  Abdominal pain affecting pregnancy (HCC): acute illness or injury that poses a threat to life or bodily functions    Amount and/or Complexity of Data Reviewed  Labs: ordered. Decision-making details documented in ED Course.  Radiology: ordered and independent interpretation performed. Decision-making details documented in ED Course.     Details: I personally viewed the patient's pelvic ultrasound images and noted no free fluid in the pelvis        Condition upon leaving the department: Stable    Disposition and Plan     Clinical Impression:  1. Abdominal pain affecting pregnancy (HCC)        Disposition:  Discharge    Follow-up:  Vangie Toscano MD  [address] [place] [id number]  [phone]    Schedule an appointment as soon as possible for a visit in 3 day(s)        Medications Prescribed:  Discharge Medication List as of 3/14/2025  5:49 PM                           [1] (Not in a hospital admission)

## 2025-04-16 ENCOUNTER — APPOINTMENT (OUTPATIENT)
Dept: GENERAL RADIOLOGY | Facility: HOSPITAL | Age: 25
End: 2025-04-16
Attending: EMERGENCY MEDICINE
Payer: MEDICAID

## 2025-04-16 ENCOUNTER — HOSPITAL ENCOUNTER (EMERGENCY)
Facility: HOSPITAL | Age: 25
Discharge: HOME | End: 2025-04-16
Attending: EMERGENCY MEDICINE
Payer: MEDICAID

## 2025-04-16 VITALS
WEIGHT: 108 LBS | OXYGEN SATURATION: 100 % | TEMPERATURE: 98 F | HEART RATE: 68 BPM | SYSTOLIC BLOOD PRESSURE: 104 MMHG | RESPIRATION RATE: 18 BRPM | DIASTOLIC BLOOD PRESSURE: 70 MMHG | HEIGHT: 64 IN | BODY MASS INDEX: 18.44 KG/M2

## 2025-04-16 DIAGNOSIS — M25.512 ACUTE PAIN OF LEFT SHOULDER: Primary | ICD-10-CM

## 2025-04-16 DIAGNOSIS — Z3A.10 10 WEEKS GESTATION OF PREGNANCY (HCC): ICD-10-CM

## 2025-04-16 PROCEDURE — 99283 EMERGENCY DEPT VISIT LOW MDM: CPT

## 2025-04-16 PROCEDURE — 73030 X-RAY EXAM OF SHOULDER: CPT | Performed by: EMERGENCY MEDICINE

## 2025-04-16 RX ORDER — LIDOCAINE 50 MG/G
1 PATCH TOPICAL
Qty: 14 PATCH | Refills: 0 | Status: SHIPPED | OUTPATIENT
Start: 2025-04-16

## 2025-04-16 RX ORDER — ACETAMINOPHEN 325 MG/1
650 TABLET ORAL EVERY 6 HOURS PRN
Qty: 30 TABLET | Refills: 0 | Status: SHIPPED | OUTPATIENT
Start: 2025-04-16

## 2025-04-16 RX ORDER — ACETAMINOPHEN 500 MG
1000 TABLET ORAL ONCE
Status: COMPLETED | OUTPATIENT
Start: 2025-04-16 | End: 2025-04-16

## 2025-04-16 NOTE — DISCHARGE INSTRUCTIONS
Thank you for seeking care at [facility].  You have been seen and evaluated.    We discussed the results of your workup   Please read the instructions provided   If given prescriptions, take as instructed    Remember, your care process does not end after your visit today. Please follow-up with your doctor within 1-2 days for a follow-up check to ensure you are  improving, to see if you need any further evaluation/testing, or to evaluate for any alternate diagnoses.     Please return to the emergency department if you develop arm swelling, chest pain, difficulty breathing, inability to drink liquids without vomiting, one sided numbness or weakness, slurred speech, severe headache, or if you develop any other new or concerning symptoms as these could be signs of more serious medical illness.    We hope you feel better.

## 2025-04-16 NOTE — ED PROVIDER NOTES
Patient Seen in: [facility] Emergency Department    History     Chief Complaint   Patient presents with    Arm or Hand Injury     Stated Complaint: Arm Injury    HPI    HPI: Steffi Garsia is a 24 year old female currently 10 wga, LMP Feb 3, 2025 otherwise healthy who presents with pain in her left deltoid muscle/shoulder that started last night around 6:30pm when she was laying on her right side and pulled a blanket across her. States she reached far with her left arm to do this and developed pain in the shoulder afterwards that has been constant and progressively worsening. Pain is worse with abducting her arm and improved at rest/keeping it flexed. No pain meds taken PTA as patient states she is pregnant and can't really take much for pain. Reports tingling in her upper arm earlier but none currently. No arm swelling. No chest pain or SOB. No fever. No abd pain, pelvic pain, vaginal bleeding or gush of fluid.     Past Medical History[1]    Past Surgical History[2]         Family History[3]    Short Social Hx on File[4]    Review of Systems    Positive for stated complaint: Arm Injury  Other systems are as noted in HPI.  Constitutional and vital signs reviewed.      All other systems reviewed and negative except as noted above.    PSFH elements reviewed from today and agreed except as otherwise stated in HPI.    Physical Exam     ED Triage Vitals [04/16/25 1321]   /65   Pulse 63   Resp 16   Temp 97.7 °F (36.5 °C)   Temp src Temporal   SpO2 100 %   O2 Device None (Room air)       Current:/70   Pulse 68   Temp 97.7 °F (36.5 °C) (Temporal)   Resp 18   Ht 162.6 cm (5' 4\")   Wt 49 kg   LMP 02/03/2025 (Exact Date)   SpO2 100%   BMI 18.54 kg/m²         Physical Exam      MENTAL STATUS: Alert, oriented, and cooperative. No focal deficit  HEAD: Atraumatic  NECK: Supple, full range of motion without midline C spine ttp, no paraspinal mm tenderness bilaterally  CV: RRR no MRG, 2+ radial pulses  bilaterally  RESP: lungs clear no wheezes or crackles, no tachypnea   EXTREMITIES: left shoulder withou palpable deformity.  No clavicle ttp. Pt able to anteriorly and laterally abduct shoulder to about 45 degrees but further ROM elicits pain. +empty can and Barron test to LUE. Mildly tender over ant shoulder and to deltoid muscle belly. No ttp to distal L arm, elbow, forearm, wrist or hand. Compartments soft in LUE throughout.   NEURO:Sensation to touch is intact and symmetric throughout bilat UE. Radial, median, ulnar strength/sensory distribution intact ot R hand. 5/5 bilat handgrip and elbow flexion/extension.   SKIN: No open wounds, no rashes. No bruising.   PSYCH: Normal affect. Calm and cooperative.        ED Course   Labs Reviewed - No data to display    MDM       24F currently 10wga with L shoulder pain, worse with movements, intact strength/pulses on exam, no deformity. Differential diagnosis to include shoulder subluxation, rotator cuff tear, clinically low suspicion for fracture. No arm swelling to suggest DVT.    Plan: XR L shoulder, after shared decision making pt would like XR and shielding of abdomen. Will give tylenol, lido patch and reevaluate. Anticipate likely DC with outpatient orthopedic surgical follow up     @XR SHOULDER, COMPLETE (MIN 2 VIEWS), LEFT (CPT=73030)  Result Date: 4/16/2025  CONCLUSION: No acute fracture/dislocation.    Dictated by (CST): Misha Jack MD on 4/16/2025 at 3:19 PM     Finalized by (CST): Misha Jack MD on 4/16/2025 at 3:20 PM            ED Course as of 04/16/25 1524  ------------------------------------------------------------  Time: 04/16 1522  Value: XR SHOULDER, COMPLETE (MIN 2 VIEWS), LEFT (CPT=73030)  Comment:   FINDINGS:  BONES: Normal. No significant arthropathy, fracture or acute abnormality.  SOFT TISSUES: Negative. No visible soft tissue swelling.  EFFUSION: None visible.  OTHER: Negative.              Impression  CONCLUSION: No acute  fracture/dislocation.          ------------------------------------------------------------  Time: 04/16 1523  Comment: Patient reassessed updated with results.  All questions answered.  Advise gentle range of motion at home, can use sling for comfort but advised to avoid wearing it at all times to avoid stiffness, Tylenol, lidocaine patches as needed for pain and follow-up with orthopedics particular if symptoms not improved over the next few days as I suspect she may have a rotator cuff injury based on her exam.  Return if new or worsening symptoms including chest pain, shortness of breath, arm swelling, neurologic symptoms or other concerns         Disposition and Plan     Clinical Impression:  1. Acute pain of left shoulder    2. 10 weeks gestation of pregnancy (HCC)        Disposition:  Discharge    Follow-up:  Anjali Colby MD  [address] [facility] [id number] [phone]    Schedule an appointment as soon as possible for a visit in 2 day(s)      [facility] Emergency Department  [address] [facility] [address] [facility] [address]  [phone]  Go to  If symptoms worsen, right away    Frankie Tobar MD  [address] [facility] [address]  [phone]    Schedule an appointment as soon as possible for a visit in 1 week(s)      Aracelis Keita  [address] [facility] [address]  [facility] [id number] [phone]    Schedule an appointment as soon as possible for a visit in 1 week(s)        Medications Prescribed:  Current Discharge Medication List        START taking these medications    Details   lidocaine 5 % External Patch Place 1 patch onto the skin Q24H PRN (pain).  Qty: 14 patch, Refills: 0      acetaminophen (TYLENOL) 325 MG Oral Tab Take 2 tablets (650 mg total) by mouth every 6 (six) hours as needed.  Qty: 30 tablet, Refills: 0             Astrid Sapadin, DO  Attending Physician  Emergency Medicine                  [1] History reviewed. No pertinent past medical history.  [2] History  reviewed. No pertinent surgical history.  [3] No family history on file.  [4]   Social History  Socioeconomic History    Marital status: Single   Tobacco Use    Smoking status: Some Days    Smokeless tobacco: Former   Vaping Use    Vaping status: Never Used   Substance and Sexual Activity    Alcohol use: No    Drug use: Not Currently     Types: Cannabis     Social Drivers of Health     Food Insecurity: No Food Insecurity (4/29/2024)    Received from [facility]    Hunger Vital Sign     Worried About Running Out of Food in the Last Year: Never true     Ran Out of Food in the Last Year: Never true   Transportation Needs: No Transportation Needs (5/16/2024)    [facility]     Lack of Transportation (Medical): No     Lack of Transportation (Non-Medical): No   Recent Concern: Transportation Needs - Unmet Transportation Needs (4/29/2024)    Received from [facility] - Transportation     Lack of Transportation (Medical): No     Lack of Transportation (Non-Medical): Yes   Stress: No Stress Concern Present (5/16/2024)    Received from [facility]    [employer] [place] [employer] - Occupational Stress Questionnaire     Feeling of Stress : Not at all   Housing Stability: Low Risk  (4/29/2024)    Received from [facility]    Housing Stability Vital Sign     Unable to Pay for Housing in the Last Year: No     Number of Places Lived in the Last Year: 1     Unstable Housing in the Last Year: No

## 2025-05-01 LAB
AMB EXT ANTIBODY SCREEN: NEGATIVE
AMB EXT HBSAG SCREEN: NEGATIVE
AMB EXT HEMATOCRIT: 37.4
AMB EXT HEMOGLOBIN: 12.6
AMB EXT HEPATITIS C VIRUS ANTIBODY: NEGATIVE
AMB EXT MCV: 89.7
AMB EXT PLATELETS: 217
AMB EXT RH FACTOR: POSITIVE
AMB EXT RUBELLA ANTIBODIES, IGG: 19.9
AMB EXT VARICELLA ZOSTER IGG IMMUNITY: POSITIVE

## 2025-07-03 ENCOUNTER — PATIENT MESSAGE (OUTPATIENT)
Dept: OBGYN CLINIC | Facility: CLINIC | Age: 25
End: 2025-07-03

## 2025-07-08 ENCOUNTER — OFFICE VISIT (OUTPATIENT)
Dept: OBGYN CLINIC | Facility: CLINIC | Age: 25
End: 2025-07-08
Payer: MEDICAID

## 2025-07-08 VITALS
BODY MASS INDEX: 20.32 KG/M2 | SYSTOLIC BLOOD PRESSURE: 96 MMHG | HEART RATE: 84 BPM | HEIGHT: 64 IN | DIASTOLIC BLOOD PRESSURE: 59 MMHG | WEIGHT: 119 LBS

## 2025-07-08 DIAGNOSIS — Z71.89 PRENATAL CONSULT: Primary | ICD-10-CM

## 2025-07-08 PROBLEM — Z87.59 HISTORY OF PRIOR PREGNANCY WITH SGA NEWBORN: Status: ACTIVE | Noted: 2025-07-08

## 2025-07-08 RX ORDER — PSEUDOEPHED/ACETAMINOPH/DIPHEN 30MG-500MG
1-2 TABLET ORAL EVERY 6 HOURS PRN
COMMUNITY
Start: 2025-02-19

## 2025-07-08 RX ORDER — PRENATAL VIT/IRON FUM/FOLIC AC 27MG-0.8MG
1 TABLET ORAL DAILY
COMMUNITY

## 2025-07-08 NOTE — PROGRESS NOTES
Here for meet & greet. Had unmedicated birth, though was not planning unmedicated but came in complete. Came in for labor but was early and sent home and then came back complete. No complications in pregnancy thus far. No complication in last pregnancy, baby 5# 15 oz @ 39 wks. Discussed CNM care. Appropriate for care. To schedule nurse ed visit.

## 2025-07-11 ENCOUNTER — TELEPHONE (OUTPATIENT)
Dept: OBGYN CLINIC | Facility: CLINIC | Age: 25
End: 2025-07-11
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
## 2025-07-11 NOTE — TELEPHONE ENCOUNTER
Called patient for nurse education visit. Unable to leave VM due to call ringing and ringing and then just a dialtone. Will attempt to call back in 15 minutes.

## 2025-07-14 ENCOUNTER — TELEPHONE (OUTPATIENT)
Dept: OBGYN CLINIC | Facility: CLINIC | Age: 25
End: 2025-07-14
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
## 2025-07-16 ENCOUNTER — NURSE ONLY (OUTPATIENT)
Dept: OBGYN CLINIC | Facility: CLINIC | Age: 25
End: 2025-07-16

## 2025-07-16 DIAGNOSIS — Z34.82 ENCOUNTER FOR SUPERVISION OF OTHER NORMAL PREGNANCY IN SECOND TRIMESTER (HCC): Primary | ICD-10-CM

## 2025-07-16 NOTE — PROGRESS NOTES
Pt called today for RN OB Education.   Pt verified name and .     OB History    Para Term  AB Living   4 1 1 0 2 1   SAB IAB Ectopic Multiple Live Births   0 1 1 0 1      # Outcome Date GA Lbr Kevyn/2nd Weight Sex Type Anes PTL Lv   4 Current            3 IAB 2023           2 Ectopic 10/2023           1 Term 19 39w0d  5 lb 15 oz F Vag-Spont   ROLDAN     Hx Prior Abnormal Pap: No  Pap Date: 24  Pap Result Notes: NILM    How did you learn of midwives: Previous pt     Labor preferences: Epidural    Following a special diet:  N/A    LMP: 2/3/25    Pre  BMI: 18.87    EPDS score:     Working LIZY: 11/10/25    Hx of genetic abnormality in family: Denies    Hx of varicella: Vaccinated     Consent (if needed): N/A    Sterilization/Contraception: Undecided    OUD Screening: Pt. Has answered NO 5P questions and has NO  risk factors.    Pt. Given What pregnant women need to know handout.      SDOH Screening: Low risk      Educational material reviewed with patient: Prenatal care, nutrition, weight gain recommendations, travel, exercise, intercourse, pregnancy changes, safe medications, pregnancy and work, fetal movement, labor and  labor, warning signs, food safety, tdap, cord blood, breastfeeding, circumcision, and Group B strep.   Preferred feeding method - both breastfeeding and formula  Circ - N/A    Blood transfusion if needed: Consents      PN labs: Completed with [employer]. Results entered into prenatal episode.       Optional genetic screening labs were reviewed: Cell FreeDNA, FTS with US, Quad screen MSAFP and CF screening.   MFM NT screen completed with [facility].       [facility] Media Policy: Discussed. Pt verbalized understanding and agreed.      NOB apt:   MES

## 2025-07-17 ENCOUNTER — INITIAL PRENATAL (OUTPATIENT)
Dept: OBGYN CLINIC | Facility: CLINIC | Age: 25
End: 2025-07-17
Payer: MEDICAID

## 2025-07-17 VITALS
DIASTOLIC BLOOD PRESSURE: 66 MMHG | WEIGHT: 121 LBS | SYSTOLIC BLOOD PRESSURE: 102 MMHG | BODY MASS INDEX: 21 KG/M2 | HEART RATE: 97 BPM

## 2025-07-17 DIAGNOSIS — O09.92 HIGH RISK PREGNANCY CASE MANAGEMENT PATIENT IN SECOND TRIMESTER (HCC): Primary | ICD-10-CM

## 2025-07-17 DIAGNOSIS — Z88.0 ALLERGY TO AMOXICILLIN: ICD-10-CM

## 2025-07-17 DIAGNOSIS — Z87.59 HISTORY OF PRIOR PREGNANCY WITH SGA NEWBORN: ICD-10-CM
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
## 2025-07-17 PROBLEM — N94.6 MENSTRUAL CRAMPS: Status: RESOLVED | Noted: 2024-05-16 | Resolved: 2025-07-17

## 2025-07-17 PROBLEM — F12.90 MARIJUANA USE DURING PREGNANCY (HCC): Status: ACTIVE | Noted: 2025-07-17

## 2025-07-17 PROBLEM — J45.20 MILD INTERMITTENT ASTHMA WITHOUT COMPLICATION (HCC): Status: ACTIVE | Noted: 2025-07-17

## 2025-07-17 PROBLEM — O99.320 MARIJUANA USE DURING PREGNANCY (HCC): Status: ACTIVE | Noted: 2025-07-17

## 2025-07-17 PROCEDURE — 0500F INITIAL PRENATAL CARE VISIT: CPT | Performed by: ADVANCED PRACTICE MIDWIFE
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
## 2025-08-03 ENCOUNTER — PATIENT MESSAGE (OUTPATIENT)
Dept: OBGYN CLINIC | Facility: CLINIC | Age: 25
End: 2025-08-03

## 2025-08-12 ENCOUNTER — ROUTINE PRENATAL (OUTPATIENT)
Dept: OBGYN CLINIC | Facility: CLINIC | Age: 25
End: 2025-08-12

## 2025-08-12 VITALS
HEART RATE: 109 BPM | BODY MASS INDEX: 22 KG/M2 | DIASTOLIC BLOOD PRESSURE: 72 MMHG | WEIGHT: 126 LBS | SYSTOLIC BLOOD PRESSURE: 115 MMHG

## 2025-08-12 DIAGNOSIS — Z34.82 ENCOUNTER FOR SUPERVISION OF OTHER NORMAL PREGNANCY IN SECOND TRIMESTER (HCC): Primary | ICD-10-CM

## 2025-08-12 PROCEDURE — 99213 OFFICE O/P EST LOW 20 MIN: CPT | Performed by: ADVANCED PRACTICE MIDWIFE

## 2025-08-13 ENCOUNTER — PATIENT MESSAGE (OUTPATIENT)
Dept: OBGYN CLINIC | Facility: CLINIC | Age: 25
End: 2025-08-13

## 2025-08-14 ENCOUNTER — TELEPHONE (OUTPATIENT)
Dept: OBGYN CLINIC | Facility: CLINIC | Age: 25
End: 2025-08-14

## 2025-08-18 ENCOUNTER — LAB ENCOUNTER (OUTPATIENT)
Dept: LAB | Facility: HOSPITAL | Age: 25
End: 2025-08-18
Attending: ADVANCED PRACTICE MIDWIFE

## 2025-08-18 DIAGNOSIS — Z34.82 ENCOUNTER FOR SUPERVISION OF OTHER NORMAL PREGNANCY IN SECOND TRIMESTER (HCC): ICD-10-CM

## 2025-08-18 LAB
DEPRECATED RDW RBC AUTO: 40 FL (ref 35.1–46.3)
ERYTHROCYTE [DISTWIDTH] IN BLOOD BY AUTOMATED COUNT: 12.1 % (ref 11–15)
GLUCOSE 1H P GLC SERPL-MCNC: 107 MG/DL (ref 70–130)
HCT VFR BLD AUTO: 32.7 % (ref 35–48)
HGB BLD-MCNC: 10.9 G/DL (ref 12–16)
MCH RBC QN AUTO: 30.1 PG (ref 26–34)
MCHC RBC AUTO-ENTMCNC: 33.3 G/DL (ref 31–37)
MCV RBC AUTO: 90.3 FL (ref 80–100)
PLATELET # BLD AUTO: 228 10(3)UL (ref 150–450)
RBC # BLD AUTO: 3.62 X10(6)UL (ref 3.8–5.3)
T PALLIDUM AB SER QL IA: NONREACTIVE
WBC # BLD AUTO: 12.3 X10(3) UL (ref 4–11)

## 2025-08-18 PROCEDURE — 85027 COMPLETE CBC AUTOMATED: CPT

## 2025-08-18 PROCEDURE — 86780 TREPONEMA PALLIDUM: CPT

## 2025-08-18 PROCEDURE — 36415 COLL VENOUS BLD VENIPUNCTURE: CPT

## 2025-08-18 PROCEDURE — 87389 HIV-1 AG W/HIV-1&-2 AB AG IA: CPT

## 2025-08-18 PROCEDURE — 82950 GLUCOSE TEST: CPT

## 2025-08-19 PROBLEM — O99.019 ANEMIA IN PREG-UNSPEC (HCC): Status: ACTIVE | Noted: 2023-04-06

## 2025-08-25 ENCOUNTER — ROUTINE PRENATAL (OUTPATIENT)
Dept: OBGYN CLINIC | Facility: CLINIC | Age: 25
End: 2025-08-25

## 2025-08-25 ENCOUNTER — TELEPHONE (OUTPATIENT)
Dept: OBGYN CLINIC | Facility: CLINIC | Age: 25
End: 2025-08-25

## 2025-08-25 VITALS
WEIGHT: 129 LBS | DIASTOLIC BLOOD PRESSURE: 56 MMHG | HEART RATE: 84 BPM | BODY MASS INDEX: 22 KG/M2 | SYSTOLIC BLOOD PRESSURE: 107 MMHG

## 2025-08-25 DIAGNOSIS — Z23 NEED FOR TDAP VACCINATION: ICD-10-CM

## 2025-08-25 DIAGNOSIS — Z87.59 HISTORY OF PRIOR PREGNANCY WITH SGA NEWBORN: Primary | ICD-10-CM

## 2025-08-25 PROCEDURE — 90471 IMMUNIZATION ADMIN: CPT

## 2025-08-25 PROCEDURE — 99214 OFFICE O/P EST MOD 30 MIN: CPT

## 2025-08-25 PROCEDURE — 90715 TDAP VACCINE 7 YRS/> IM: CPT

## 2025-08-26 ENCOUNTER — OFFICE VISIT (OUTPATIENT)
Dept: PHYSICAL THERAPY | Facility: HOSPITAL | Age: 25
End: 2025-08-26
Attending: ADVANCED PRACTICE MIDWIFE

## 2025-08-26 DIAGNOSIS — Z34.82 ENCOUNTER FOR SUPERVISION OF OTHER NORMAL PREGNANCY IN SECOND TRIMESTER (HCC): Primary | ICD-10-CM

## 2025-08-26 PROCEDURE — 97140 MANUAL THERAPY 1/> REGIONS: CPT | Performed by: PHYSICAL THERAPIST

## 2025-08-26 PROCEDURE — 97110 THERAPEUTIC EXERCISES: CPT | Performed by: PHYSICAL THERAPIST

## 2025-08-29 ENCOUNTER — APPOINTMENT (OUTPATIENT)
Dept: PHYSICAL THERAPY | Facility: HOSPITAL | Age: 25
End: 2025-08-29
Attending: ADVANCED PRACTICE MIDWIFE

## (undated) DEVICE — TROCAR: Brand: KII FIOS FIRST ENTRY

## (undated) DEVICE — LAPAROSCOPY: Brand: MEDLINE INDUSTRIES, INC.

## (undated) DEVICE — GAUZE SPONGES,8 PLY: Brand: CURITY

## (undated) DEVICE — TISSUE RETRIEVAL SYSTEM: Brand: INZII RETRIEVAL SYSTEM

## (undated) DEVICE — SUTURE MCRYL SZ 4-0 L18IN ABSRB UD L19MM PS-2

## (undated) DEVICE — GAMMEX® PI HYBRID SIZE 6.5, STERILE POWDER-FREE SURGICAL GLOVE, POLYISOPRENE AND NEOPRENE BLEND: Brand: GAMMEX

## (undated) DEVICE — INJECTOR MANIP L13CM DIA5MM BLLN TIP KRONNER

## (undated) DEVICE — SUTURE VCRL SZ 0 L27IN ABSRB VLT L26MM UR-6

## (undated) DEVICE — DISPOSABLE SUCTION/IRRIGATOR TUBE SET: Brand: AHTO

## (undated) DEVICE — TRAY CATH 16FR F INCLUDE BARDX IC COMPLT CARE

## (undated) DEVICE — 3M™ TEGADERM™ TRANSPARENT FILM DRESSING, 1626W, 4 IN X 4-3/4 IN (10 CM X 12 CM), 50 EACH/CARTON, 4 CARTON/CASE: Brand: 3M™ TEGADERM™

## (undated) DEVICE — TROCARS: Brand: KII® BALLOON BLUNT TIP SYSTEM

## (undated) DEVICE — ADHESIVE SKIN TOP FOR WND CLSR DERMBND ADV

## (undated) DEVICE — SOLUTION IRRIG 1000ML 0.9% NACL USP BTL

## (undated) DEVICE — ENSEAL X1 STRAIGHT 37CM SHAFT: Brand: HARMONIC

## (undated) DEVICE — [HIGH FLOW INSUFFLATOR,  DO NOT USE IF PACKAGE IS DAMAGED,  KEEP DRY,  KEEP AWAY FROM SUNLIGHT,  PROTECT FROM HEAT AND RADIOACTIVE SOURCES.]: Brand: PNEUMOSURE

## (undated) DEVICE — SOLUTION IRRIG 3000ML 0.9% NACL FLX CONT

## (undated) DEVICE — TROCAR: Brand: KII SLEEVE

## (undated) NOTE — ED AVS SNAPSHOT
Zayda Null   MRN: [id number]    Department:  [facility] Emergency Department   Date of Visit:  12/4/2018           Disclosure     Insurance plans vary and the physician(s) referred by the ER may not be covered by your plan.  Please contac CARE PHYSICIAN AT ONCE OR RETURN IMMEDIATELY TO THE EMERGENCY DEPARTMENT. If you have been prescribed any medication(s), please fill your prescription right away and begin taking the medication(s) as directed.   If you believe that any of the medications

## (undated) NOTE — ED AVS SNAPSHOT
Genoveva Orellana   MRN: [id number]    Department:  [facility] Emergency Department   Date of Visit:  10/20/2018           Disclosure     Insurance plans vary and the physician(s) referred by the ER may not be covered by your plan.  Please conta CARE PHYSICIAN AT ONCE OR RETURN IMMEDIATELY TO THE EMERGENCY DEPARTMENT. If you have been prescribed any medication(s), please fill your prescription right away and begin taking the medication(s) as directed.   If you believe that any of the medications

## (undated) NOTE — ED AVS SNAPSHOT
Doreen Cheng   MRN: [id number]    Department:  [facility] Emergency Department   Date of Visit:  7/6/2018           Disclosure     Insurance plans vary and the physician(s) referred by the ER may not be covered by your plan.  Please contact CARE PHYSICIAN AT ONCE OR RETURN IMMEDIATELY TO THE EMERGENCY DEPARTMENT. If you have been prescribed any medication(s), please fill your prescription right away and begin taking the medication(s) as directed.   If you believe that any of the medications

## (undated) NOTE — LETTER
Date & Time: 10/5/2023, 11:16 AM  Patient: Giovanni Garcia  Encounter Provider(s):    MD Katherine Cagle APRN       To Whom It May Concern:    Alex Segovia was seen and treated in our department on 10/3/2023.     If you have any questions or concerns, please do not hesitate to call.        _____________________________  Physician/APC Signature

## (undated) NOTE — LETTER
Date & Time: 7/6/2018, 2:14 PM  Patient: Lexy Brooks  Encounter Provider(s):    LUIS Leonardo       To Whom It May Concern:    Aneta Miner was seen and treated in our department on 7/6/2018.  She should not return to work until 7/8

## (undated) NOTE — LETTER
Date & Time: 12/4/2018, 11:16 AM  Patient: Tyler Purchase  Encounter Provider(s):    Griselda Abdalla MD       To Whom It May Concern:    Ruben Luna was seen and treated in our department on 12/4/2018.  She should not return to work until CHON Lam

## (undated) NOTE — MR AVS SNAPSHOT
[facility] SPECIALTY [facility] [place] [address] [address] [address]  [phone]               Thank you for choosing us for your health care visit with Talya Smith MD.  We are glad to serve you and happy to provide you with this summary of yo Call [phone] for help. [employer] is NOT to be used for urgent needs. For medical emergencies, dial 911.                Visit [facility] online at  [place]

## (undated) NOTE — ED AVS SNAPSHOT
Lawrence Baker   MRN: [id number]    Department:  [facility] Emergency Department   Date of Visit:  6/20/2018           Disclosure     Insurance plans vary and the physician(s) referred by the ER may not be covered by your plan.  Please contac CARE PHYSICIAN AT ONCE OR RETURN IMMEDIATELY TO THE EMERGENCY DEPARTMENT. If you have been prescribed any medication(s), please fill your prescription right away and begin taking the medication(s) as directed.   If you believe that any of the medications

## (undated) NOTE — LETTER
Date & Time: 10/20/2018, 11:57 AM  Patient: Karma Burnett  Encounter Provider(s):    Vicki Rausch       To Whom It May Concern:    Cristhian Jorge was seen and treated in our department on 10/20/2018. She may return to work on 10/22/2018.     I

## (undated) NOTE — LETTER
Date & Time: 12/17/2024, 1:12 PM  Patient: Steffi Garsia  Encounter Provider(s):    Jamaica Nichols APRN       To Whom It May Concern:    Steffi Garsia was seen and treated in our department on 12/17/2024. She can return to work when she remains fever free without the use of fever reducing medications.  She will be off today 12/17, and tomorrow 12/18.    SAMMY Palacios, 12/17/24, 1:12 PM